# Patient Record
Sex: FEMALE | Race: OTHER | HISPANIC OR LATINO | URBAN - METROPOLITAN AREA
[De-identification: names, ages, dates, MRNs, and addresses within clinical notes are randomized per-mention and may not be internally consistent; named-entity substitution may affect disease eponyms.]

---

## 2021-12-30 ENCOUNTER — EMERGENCY (EMERGENCY)
Facility: HOSPITAL | Age: 21
LOS: 1 days | Discharge: HOME | End: 2021-12-30
Attending: EMERGENCY MEDICINE
Payer: MEDICAID

## 2021-12-30 VITALS
WEIGHT: 130.07 LBS | RESPIRATION RATE: 18 BRPM | HEIGHT: 62.99 IN | SYSTOLIC BLOOD PRESSURE: 120 MMHG | TEMPERATURE: 99 F | DIASTOLIC BLOOD PRESSURE: 64 MMHG | HEART RATE: 78 BPM | OXYGEN SATURATION: 99 %

## 2021-12-30 VITALS
SYSTOLIC BLOOD PRESSURE: 113 MMHG | DIASTOLIC BLOOD PRESSURE: 76 MMHG | HEART RATE: 63 BPM | OXYGEN SATURATION: 99 % | RESPIRATION RATE: 18 BRPM

## 2021-12-30 DIAGNOSIS — Z98.891 HISTORY OF UTERINE SCAR FROM PREVIOUS SURGERY: Chronic | ICD-10-CM

## 2021-12-30 LAB
ALBUMIN SERPL ELPH-MCNC: 3.6 G/DL — SIGNIFICANT CHANGE UP (ref 3.5–5)
ALP SERPL-CCNC: 72 U/L — SIGNIFICANT CHANGE UP (ref 40–120)
ALT FLD-CCNC: 27 U/L DA — SIGNIFICANT CHANGE UP (ref 10–60)
ANION GAP SERPL CALC-SCNC: 11 MMOL/L — SIGNIFICANT CHANGE UP (ref 5–17)
APPEARANCE UR: CLEAR — SIGNIFICANT CHANGE UP
AST SERPL-CCNC: 12 U/L — SIGNIFICANT CHANGE UP (ref 10–40)
BACTERIA # UR AUTO: ABNORMAL /HPF
BASOPHILS # BLD AUTO: 0.02 K/UL — SIGNIFICANT CHANGE UP (ref 0–0.2)
BASOPHILS NFR BLD AUTO: 0.3 % — SIGNIFICANT CHANGE UP (ref 0–2)
BILIRUB SERPL-MCNC: 0.4 MG/DL — SIGNIFICANT CHANGE UP (ref 0.2–1.2)
BILIRUB UR-MCNC: NEGATIVE — SIGNIFICANT CHANGE UP
BLD GP AB SCN SERPL QL: SIGNIFICANT CHANGE UP
BUN SERPL-MCNC: 7 MG/DL — SIGNIFICANT CHANGE UP (ref 7–18)
CALCIUM SERPL-MCNC: 9.3 MG/DL — SIGNIFICANT CHANGE UP (ref 8.4–10.5)
CHLORIDE SERPL-SCNC: 106 MMOL/L — SIGNIFICANT CHANGE UP (ref 96–108)
CO2 SERPL-SCNC: 22 MMOL/L — SIGNIFICANT CHANGE UP (ref 22–31)
COLOR SPEC: YELLOW — SIGNIFICANT CHANGE UP
CREAT SERPL-MCNC: 0.41 MG/DL — LOW (ref 0.5–1.3)
DIFF PNL FLD: ABNORMAL
EOSINOPHIL # BLD AUTO: 0.09 K/UL — SIGNIFICANT CHANGE UP (ref 0–0.5)
EOSINOPHIL NFR BLD AUTO: 1.4 % — SIGNIFICANT CHANGE UP (ref 0–6)
EPI CELLS # UR: SIGNIFICANT CHANGE UP /HPF
GLUCOSE SERPL-MCNC: 91 MG/DL — SIGNIFICANT CHANGE UP (ref 70–99)
GLUCOSE UR QL: NEGATIVE — SIGNIFICANT CHANGE UP
HCG SERPL-ACNC: HIGH MIU/ML
HCT VFR BLD CALC: 39.4 % — SIGNIFICANT CHANGE UP (ref 34.5–45)
HGB BLD-MCNC: 13.7 G/DL — SIGNIFICANT CHANGE UP (ref 11.5–15.5)
IMM GRANULOCYTES NFR BLD AUTO: 0.3 % — SIGNIFICANT CHANGE UP (ref 0–1.5)
KETONES UR-MCNC: NEGATIVE — SIGNIFICANT CHANGE UP
LEUKOCYTE ESTERASE UR-ACNC: NEGATIVE — SIGNIFICANT CHANGE UP
LYMPHOCYTES # BLD AUTO: 2.89 K/UL — SIGNIFICANT CHANGE UP (ref 1–3.3)
LYMPHOCYTES # BLD AUTO: 45.5 % — HIGH (ref 13–44)
MCHC RBC-ENTMCNC: 30.6 PG — SIGNIFICANT CHANGE UP (ref 27–34)
MCHC RBC-ENTMCNC: 34.8 GM/DL — SIGNIFICANT CHANGE UP (ref 32–36)
MCV RBC AUTO: 88.1 FL — SIGNIFICANT CHANGE UP (ref 80–100)
MONOCYTES # BLD AUTO: 0.46 K/UL — SIGNIFICANT CHANGE UP (ref 0–0.9)
MONOCYTES NFR BLD AUTO: 7.2 % — SIGNIFICANT CHANGE UP (ref 2–14)
NEUTROPHILS # BLD AUTO: 2.87 K/UL — SIGNIFICANT CHANGE UP (ref 1.8–7.4)
NEUTROPHILS NFR BLD AUTO: 45.3 % — SIGNIFICANT CHANGE UP (ref 43–77)
NITRITE UR-MCNC: NEGATIVE — SIGNIFICANT CHANGE UP
NRBC # BLD: 0 /100 WBCS — SIGNIFICANT CHANGE UP (ref 0–0)
PH UR: 6.5 — SIGNIFICANT CHANGE UP (ref 5–8)
PLATELET # BLD AUTO: 327 K/UL — SIGNIFICANT CHANGE UP (ref 150–400)
POTASSIUM SERPL-MCNC: 3.6 MMOL/L — SIGNIFICANT CHANGE UP (ref 3.5–5.3)
POTASSIUM SERPL-SCNC: 3.6 MMOL/L — SIGNIFICANT CHANGE UP (ref 3.5–5.3)
PROT SERPL-MCNC: 8.1 G/DL — SIGNIFICANT CHANGE UP (ref 6–8.3)
PROT UR-MCNC: 15
RBC # BLD: 4.47 M/UL — SIGNIFICANT CHANGE UP (ref 3.8–5.2)
RBC # FLD: 11.9 % — SIGNIFICANT CHANGE UP (ref 10.3–14.5)
RBC CASTS # UR COMP ASSIST: ABNORMAL /HPF (ref 0–2)
SODIUM SERPL-SCNC: 139 MMOL/L — SIGNIFICANT CHANGE UP (ref 135–145)
SP GR SPEC: 1.01 — SIGNIFICANT CHANGE UP (ref 1.01–1.02)
UROBILINOGEN FLD QL: NEGATIVE — SIGNIFICANT CHANGE UP
WBC # BLD: 6.35 K/UL — SIGNIFICANT CHANGE UP (ref 3.8–10.5)
WBC # FLD AUTO: 6.35 K/UL — SIGNIFICANT CHANGE UP (ref 3.8–10.5)
WBC UR QL: SIGNIFICANT CHANGE UP /HPF (ref 0–5)

## 2021-12-30 PROCEDURE — 76801 OB US < 14 WKS SINGLE FETUS: CPT | Mod: 26

## 2021-12-30 PROCEDURE — 86900 BLOOD TYPING SEROLOGIC ABO: CPT

## 2021-12-30 PROCEDURE — 99285 EMERGENCY DEPT VISIT HI MDM: CPT

## 2021-12-30 PROCEDURE — 81001 URINALYSIS AUTO W/SCOPE: CPT

## 2021-12-30 PROCEDURE — 80053 COMPREHEN METABOLIC PANEL: CPT

## 2021-12-30 PROCEDURE — 86850 RBC ANTIBODY SCREEN: CPT

## 2021-12-30 PROCEDURE — 85025 COMPLETE CBC W/AUTO DIFF WBC: CPT

## 2021-12-30 PROCEDURE — 99284 EMERGENCY DEPT VISIT MOD MDM: CPT | Mod: 25

## 2021-12-30 PROCEDURE — 86901 BLOOD TYPING SEROLOGIC RH(D): CPT

## 2021-12-30 PROCEDURE — 84702 CHORIONIC GONADOTROPIN TEST: CPT

## 2021-12-30 PROCEDURE — 76801 OB US < 14 WKS SINGLE FETUS: CPT

## 2021-12-30 PROCEDURE — 36415 COLL VENOUS BLD VENIPUNCTURE: CPT

## 2021-12-30 NOTE — ED PROVIDER NOTE - PATIENT PORTAL LINK FT
You can access the FollowMyHealth Patient Portal offered by [facility] by registering at the following website: [email or web address]. By joining [employer]’s FollowMyHealth portal, you will also be able to view your health information using other applications (apps) compatible with our system.

## 2021-12-30 NOTE — ED ADULT NURSE NOTE - OBJECTIVE STATEMENT
AOX4 +ambulatory patient reports 8 weeks pregnant complaining of lower abdominal pains no vag bleeding

## 2021-12-30 NOTE — ED PROVIDER NOTE - CLINICAL SUMMARY MEDICAL DECISION MAKING FREE TEXT BOX
Will check basic labs, perform ultrasound, and reevaluate. Will check basic labs, perform ultrasound, and reevaluate.fu with gyn

## 2022-01-27 ENCOUNTER — EMERGENCY (EMERGENCY)
Facility: HOSPITAL | Age: 22
LOS: 1 days | Discharge: HOME | End: 2022-01-27
Attending: EMERGENCY MEDICINE
Payer: MEDICAID

## 2022-01-27 VITALS
RESPIRATION RATE: 18 BRPM | HEART RATE: 99 BPM | OXYGEN SATURATION: 98 % | SYSTOLIC BLOOD PRESSURE: 104 MMHG | TEMPERATURE: 98 F | DIASTOLIC BLOOD PRESSURE: 70 MMHG | WEIGHT: 286.6 LBS | HEIGHT: 62.99 IN

## 2022-01-27 DIAGNOSIS — Z98.891 HISTORY OF UTERINE SCAR FROM PREVIOUS SURGERY: Chronic | ICD-10-CM

## 2022-01-27 PROCEDURE — 99282 EMERGENCY DEPT VISIT SF MDM: CPT

## 2022-01-27 PROCEDURE — 99283 EMERGENCY DEPT VISIT LOW MDM: CPT

## 2022-01-27 RX ORDER — DIPHENHYDRAMINE HCL 50 MG
1 CAPSULE ORAL
Qty: 21 | Refills: 0
Start: 2022-01-27 | End: 2022-02-02

## 2022-01-27 NOTE — ED PROVIDER NOTE - OBJECTIVE STATEMENT
intermitent mac/pap rash  none now no new soaps, creams, shampoos, no new mediacitons  pt is in first trimeseter taking prenatals intermitent mac/pap rash  none now no new soaps, creams, shampoos, no new mediacitons  pt is in first trimeseter taking prenatals  pt is [place] speaking HPI and CHASE through video

## 2022-01-27 NOTE — ED PROVIDER NOTE - CHPI ED SYMPTOMS NEG
no fever/no inflammation/no itching/no pain/no petechia/no scaly patches on skin/no vomiting/no bruising/no chills/no decreased eating/drinking

## 2022-01-27 NOTE — ED PROVIDER NOTE - PATIENT PORTAL LINK FT
You can access the FollowMyHealth Patient Portal offered by [facility] by registering at the following website: [email or web address]. By joining [employer]’s FollowMyHealth portal, you will also be able to view your health information using other applications (apps) compatible with our system.

## 2022-01-27 NOTE — ED PROVIDER NOTE - NSFOLLOWUPINSTRUCTIONS_ED_ALL_ED_FT
Urticaria    CUIDADO AMBULATORIO:    Urticariatambién se conoce sofya urticaria. Las ronchas pueden cambiar el tamaño y la forma y aparecen en cualquier lugar de la piel. La urticaria puede ser leve o severa y durar de unos minutos a unos días. Es probable que las ronchas twin un signo de anabella reacción alérgica grave conocida sofya anafilaxis que necesita tratamiento inmediato. La urticaria que dura más de 6 semanas puede ser un trastorno crónico que necesita de tratamiento de larga duración.    Ronchas         Llame al número local de emergencias (911 en [place]) si tiene síntomas o signos de anafilaxia,sofya dificultad para respirar, inflamación en fitzpatrick boca o garganta, o sibilancias. También es posible que tenga comezón, sarpullido o sienta que se va a desmayar.    Busque atención médica de inmediato si:  •Fitzpatrick corazón está latiendo más rápido de lo normal.      •Usted tiene calambres o dolor jennifer en el abdomen.      Llame a fitzpatrick médico si:  •Tiene fiebre.      •Fitzpatrick piel todavía tiene comezón 24 horas después de tomarse fitzpatrick medicamento.      •Usted todavía tiene ronchas 7 días después.      •Jose articulaciones están adoloridas e inflamadas.      •Usted tiene preguntas o inquietudes acerca de fitzpatrick condición o cuidado.      Pautas que necesito seguir para los signos o síntomas de la anafilaxia:  •Inmediatamenteaplíquese 1 inyección de epinefrina franco hágalo solamente en el músculo del muslo que da hacia afuera.  Cómo aplicar anabella inyección de epinefrina a un adulto           •Deje la inyección en el lugarsegún las indicaciones. Es probable que fitzpatrick médico le recomiende que se la deje puesta por hasta 10 segundos antes de quitarla. [place] ayuda a asegurarse de que toda la epinefrina sea aplicada.      •Llame al 911 y vaya al servicio de urgencias,aunque la inyección haya wade jose síntomas. No maneje usted mismo. Lleve con usted la inyección de epinefrina que usó.      El tratamiento para la urticariapodría no ser necesario. Es probable que la urticaria crónica tenga que ser tratada con más de un medicamento u otros medicamentos que los mencionados a continuación. Los siguientes son los medicamentos usados más comúnmente para tratar síntomas moderados no leona resultado:   •Los antihistamínicosreducen los síntomas moderados sofya la comezón o un sarpullido.      •Los esteroidesreducen el enrojecimiento, el dolor y la inflamación.      •La epinefrinase usa para tratar reacciones alérgicas graves sofya la anafilaxia.      Precauciones de seguridad a yaya si usted corre riesgo de anafilaxia:  •Tenga a mano 2 inyecciones de epinefrina en todo momento.Puede que usted necesite anabella segunda inyección ya que la epinefrina solamente actúa por aproximadamente 20 minutos y los síntomas podrían regresar. Fitzpatrick médico puede mostrarle a usted y a jose familiares cómo aplicar la inyección. Revise la fecha de vencimiento todos los meses y reemplace el medicamento de fitzpatrick vencimiento.      •Elabore un plan de acción.Fitzpatrick médico puede ayudarle a crear un plan escrito que explique la alergia y un plan de emergencia para tratar anabella reacción. El plan explica cuándo aplicar anabella segunda inyección de epinefrina si los síntomas vuelven o no mejoran después de la primera inyección. Asegúrese de que jose familiares, personal de fitzpatrick trabajo y escuela tengan anabella copia del plan de acción e instrucciones de emergencia. Muéstreles cómo aplicar la inyección de epinefrina.      •Tenga cuidado cuando espinoza ejercicio.Si usted tuvo anafilaxis inducida por el ejercicio, no se ejercite inmediatamente después de comer. Pare de ejercitarse de inmediato si empieza a desarrollar cualquier signo o síntoma de anafilaxia. Puede que al principio se sienta cansado, caliente o tenga comezón en la piel. También podría desarrollar urticaria, inflamación y problemas graves de respiración si continúa ejercitándose.      •Lleve anabella identificación de alerta médica.Use un brazalete o collar de alerta médica o lleve anabella tarjeta que explique la alergia. Pregúntele a fitzpatrick médico dónde conseguir estos artículos.   Accesorios de alerta médica           •Mantenga un diario de los desencadenantes y síntomas.Anote todo lo que usted come, david o aplique en fitzpatrick piel por 3 semanas. Incluya eventos estresantes y lo que usted estaba haciendo elisha antes de que empezara la urticaria. Traiga fitzpatrick registro a las citas de seguimiento con fitzpatrick médico.      Controle la urticaria:  •Enfríe fitzpatrick piel.Puede que esto le ayude a disminuir la comezón. Aplíquese anabella compresa fría sobre la urticaria. [place] anabella toalla en agua fría, escúrrala y póngasela sobre la urticaria. También puede empapar fitzpatrick piel en un baño de agua fría con sari.      •No se rasque las ronchas.[place] puede irritar fitzpatrick piel y causarle más ronchas.      •Use ropa holgada.La ropa ajustada podría irritar fitzpatrick piel y causarle más ronchas.      •Controle el estrés.El estrés podría provocar la urticaria o incluso empeorarla. Aprenda nuevas maneras de relajarse sofya la respiración profunda.      Acuda a la consulta de control con fitzpatrick médico según las indicaciones:Anote jose preguntas para que se acuerde de hacerlas amina jose visitas.

## 2022-01-28 PROBLEM — Z78.9 OTHER SPECIFIED HEALTH STATUS: Chronic | Status: ACTIVE | Noted: 2021-12-30
